# Patient Record
(demographics unavailable — no encounter records)

---

## 2024-11-05 NOTE — DISCUSSION/SUMMARY
[] : The components of the vaccine(s) to be administered today are listed in the plan of care. The disease(s) for which the vaccine(s) are intended to prevent and the risks have been discussed with the caretaker.  The risks are also included in the appropriate vaccination information statements which have been provided to the patient's caregiver.  The caregiver has given consent to vaccinate. [FreeTextEntry1] : R. deltoid: Quadracel , Fluzone         L. upper arm: MMR       Pt tolerated well.

## 2024-11-06 NOTE — HISTORY OF PRESENT ILLNESS
[Mother] : mother [whole ___ oz/d] : consumes [unfilled] oz of whole cow's milk per day [Sugar drinks] : sugar drinks [Fruit] : fruit [Vegetables] : vegetables [Meat] : meat [Grains] : grains [Eggs] : eggs [Fish] : fish [Dairy] : dairy [Toilet Trained] : toilet trained [Normal] : Normal [In own bed] : In own bed [Brushing teeth] : Brushing teeth [Yes] : Patient goes to dentist yearly [In Pre-K] : In Pre-K [Playtime (60 min/d)] : Playtime 60 min a day [< 2 hrs of screen time] : Less than 2 hrs of screen time [Appropiate parent-child communication] : Appropriate parent-child communication [Child given choices] : Child given choices [Child Cooperates] : Child cooperates [Parent has appropriate responses to behavior] : Parent has appropriate responses to behavior [Up to date] : Up to date [de-identified] : drinks water [FreeTextEntry3] : will sleep w mom overnight [FreeTextEntry1] : Allergies - trees, dust mites, roaches uses Claritin daily, Montelukast qhs prn for runny nose (had tried zyrtec but was wetting the bed w it)  Had chronic foot pain. Seen by Podiatry - needs custom orthotics

## 2024-11-06 NOTE — PHYSICAL EXAM
[Alert] : alert [No Acute Distress] : no acute distress [Playful] : playful [Normocephalic] : normocephalic [Conjunctivae with no discharge] : conjunctivae with no discharge [PERRL] : PERRL [EOMI Bilateral] : EOMI bilateral [Auricles Well Formed] : auricles well formed [Clear Tympanic membranes with present light reflex and bony landmarks] : clear tympanic membranes with present light reflex and bony landmarks [No Discharge] : no discharge [Nares Patent] : nares patent [Pink Nasal Mucosa] : pink nasal mucosa [Palate Intact] : palate intact [Uvula Midline] : uvula midline [Nonerythematous Oropharynx] : nonerythematous oropharynx [No Caries] : no caries [Trachea Midline] : trachea midline [Supple, full passive range of motion] : supple, full passive range of motion [No Palpable Masses] : no palpable masses [Symmetric Chest Rise] : symmetric chest rise [Clear to Auscultation Bilaterally] : clear to auscultation bilaterally [Normoactive Precordium] : normoactive precordium [Regular Rate and Rhythm] : regular rate and rhythm [Normal S1, S2 present] : normal S1, S2 present [No Murmurs] : no murmurs [+2 Femoral Pulses] : +2 femoral pulses [Soft] : soft [NonTender] : non tender [Non Distended] : non distended [No Hepatomegaly] : no hepatomegaly [No Splenomegaly] : no splenomegaly [Rashaun 1] : Rashaun 1 [No Clitoromegaly] : no clitoromegaly [Normal Vagina Introitus] : normal vagina introitus [Submandibular] : submandibular [Symmetric Buttocks Creases] : symmetric buttocks creases [Symmetric Hip Rotation] : symmetric hip rotation [No Gait Asymmetry] : no gait asymmetry [No pain or deformities with palpation of bone, muscles, joints] : no pain or deformities with palpation of bone, muscles, joints [Normal Muscle Tone] : normal muscle tone [No Spinal Dimple] : no spinal dimple [NoTuft of Hair] : no tuft of hair [Straight] : straight [+2 Patella DTR] : +2 patella DTR [Cranial Nerves Grossly Intact] : cranial nerves grossly intact [No Rash or Lesions] : no rash or lesions [de-identified] : L tonsil mildly enlarged [de-identified] : 1cm mobile soft lymph node palpated on left

## 2024-11-06 NOTE — DISCUSSION/SUMMARY
[Normal Growth] : growth [Normal Development] : development  [No Elimination Concerns] : elimination [Continue Regimen] : feeding [No Skin Concerns] : skin [Normal Sleep Pattern] : sleep [School Readiness] : school readiness [Healthy Personal Habits] : healthy personal habits [TV/Media] : tv/media [Child and Family Involvement] : child and family involvement [Safety] : safety [Anticipatory Guidance Given] : Anticipatory guidance addressed as per the history of present illness section [DTaP] : diptheria, tetanus and pertussis [Influenza] : influenza [IPV] : inactivated poliovirus [MMR] : measles, mumps and rubella [No Medication Changes] : No medication changes at this time [FreeTextEntry1] : Flaquita is a 3 y/o F here for [facility]  Health Maintenance - growing and developing well - continue daily claritin and prn montelukast per allergist - monitor left submandibular node at next routine visit - 4yr vaccines and Flu administered - return to clinic in 1yr or sooner as needed

## 2024-11-06 NOTE — DISCUSSION/SUMMARY
[Normal Growth] : growth [Normal Development] : development  [No Elimination Concerns] : elimination [Continue Regimen] : feeding [No Skin Concerns] : skin [Normal Sleep Pattern] : sleep [School Readiness] : school readiness [Healthy Personal Habits] : healthy personal habits [TV/Media] : tv/media [Child and Family Involvement] : child and family involvement [Safety] : safety [Anticipatory Guidance Given] : Anticipatory guidance addressed as per the history of present illness section [DTaP] : diptheria, tetanus and pertussis [Influenza] : influenza [IPV] : inactivated poliovirus [MMR] : measles, mumps and rubella [No Medication Changes] : No medication changes at this time [FreeTextEntry1] : Flaquita is a 5 y/o F here for [facility]  Health Maintenance - growing and developing well - continue daily claritin and prn montelukast per allergist - monitor left submandibular node at next routine visit - 4yr vaccines and Flu administered - return to clinic in 1yr or sooner as needed

## 2024-11-06 NOTE — PHYSICAL EXAM
[Alert] : alert [No Acute Distress] : no acute distress [Playful] : playful [Normocephalic] : normocephalic [Conjunctivae with no discharge] : conjunctivae with no discharge [PERRL] : PERRL [EOMI Bilateral] : EOMI bilateral [Auricles Well Formed] : auricles well formed [Clear Tympanic membranes with present light reflex and bony landmarks] : clear tympanic membranes with present light reflex and bony landmarks [No Discharge] : no discharge [Nares Patent] : nares patent [Pink Nasal Mucosa] : pink nasal mucosa [Palate Intact] : palate intact [Uvula Midline] : uvula midline [Nonerythematous Oropharynx] : nonerythematous oropharynx [No Caries] : no caries [Trachea Midline] : trachea midline [Supple, full passive range of motion] : supple, full passive range of motion [No Palpable Masses] : no palpable masses [Symmetric Chest Rise] : symmetric chest rise [Clear to Auscultation Bilaterally] : clear to auscultation bilaterally [Normoactive Precordium] : normoactive precordium [Regular Rate and Rhythm] : regular rate and rhythm [Normal S1, S2 present] : normal S1, S2 present [No Murmurs] : no murmurs [+2 Femoral Pulses] : +2 femoral pulses [Soft] : soft [NonTender] : non tender [Non Distended] : non distended [No Hepatomegaly] : no hepatomegaly [No Splenomegaly] : no splenomegaly [Rashaun 1] : Rashaun 1 [No Clitoromegaly] : no clitoromegaly [Normal Vagina Introitus] : normal vagina introitus [Submandibular] : submandibular [Symmetric Buttocks Creases] : symmetric buttocks creases [Symmetric Hip Rotation] : symmetric hip rotation [No Gait Asymmetry] : no gait asymmetry [No pain or deformities with palpation of bone, muscles, joints] : no pain or deformities with palpation of bone, muscles, joints [Normal Muscle Tone] : normal muscle tone [No Spinal Dimple] : no spinal dimple [NoTuft of Hair] : no tuft of hair [Straight] : straight [+2 Patella DTR] : +2 patella DTR [Cranial Nerves Grossly Intact] : cranial nerves grossly intact [No Rash or Lesions] : no rash or lesions [de-identified] : L tonsil mildly enlarged [de-identified] : 1cm mobile soft lymph node palpated on left

## 2024-11-06 NOTE — HISTORY OF PRESENT ILLNESS
[Mother] : mother [whole ___ oz/d] : consumes [unfilled] oz of whole cow's milk per day [Sugar drinks] : sugar drinks [Fruit] : fruit [Vegetables] : vegetables [Meat] : meat [Grains] : grains [Eggs] : eggs [Fish] : fish [Dairy] : dairy [Toilet Trained] : toilet trained [Normal] : Normal [In own bed] : In own bed [Brushing teeth] : Brushing teeth [Yes] : Patient goes to dentist yearly [In Pre-K] : In Pre-K [Playtime (60 min/d)] : Playtime 60 min a day [< 2 hrs of screen time] : Less than 2 hrs of screen time [Appropiate parent-child communication] : Appropriate parent-child communication [Child given choices] : Child given choices [Child Cooperates] : Child cooperates [Parent has appropriate responses to behavior] : Parent has appropriate responses to behavior [Up to date] : Up to date [de-identified] : drinks water [FreeTextEntry3] : will sleep w mom overnight [FreeTextEntry1] : Allergies - trees, dust mites, roaches uses Claritin daily, Montelukast qhs prn for runny nose (had tried zyrtec but was wetting the bed w it)  Had chronic foot pain. Seen by Podiatry - needs custom orthotics

## 2025-04-25 NOTE — END OF VISIT
[FreeTextEntry3] : A physician assistant/resident assisted with documenting the visit and acted as a scribe. I have seen and examined the patient, made my assessment and plan and have made all modifications necessary to the note.  Cortney Petty MD Pediatric Orthopaedics Surgery [facility]

## 2025-04-25 NOTE — END OF VISIT
[FreeTextEntry3] : A physician assistant/resident assisted with documenting the visit and acted as a scribe. I have seen and examined the patient, made my assessment and plan and have made all modifications necessary to the note.  Cortney Petty MD Pediatric Orthopaedics Surgery [facility]

## 2025-04-25 NOTE — DATA REVIEWED
[de-identified] : AP and lateral of the bilateral tibia taken in the office today. Good overall alignment. NO fracture or dislocation. No periosteal reaction noted. No lesions. Skeletally immature.

## 2025-04-25 NOTE — REASON FOR VISIT
[Initial Evaluation] : an initial evaluation [Patient] : patient [Mother] : mother [FreeTextEntry1] : bilateral leg pain

## 2025-04-25 NOTE — HISTORY OF PRESENT ILLNESS
[0] : currently ~his/her~ pain is 0 out of 10 [FreeTextEntry1] : 4 and a half yo female presents with mother for evaluation of bilateral intermittent leg pain. Mother states she has been having pain in the bilateral legs for approx 1 year. The pain is in both legs equally. Mother states she had blood work drawn by the pediatrician in November 2024 which was without concern according to mother. She was seen by podiatry a few months ago and it was felt she had pes planus and custom inserts recommended and fabricated. Mother states she has been using for approx 2 months and no change in the leg pain. The pain is in the evening before bed. It is not waking her from sleep. No swelling noted.  No limp or pain during the day. No change in activity level. Mother massages the legs and there is improvement.

## 2025-04-25 NOTE — DATA REVIEWED
[de-identified] : AP and lateral of the bilateral tibia taken in the office today. Good overall alignment. NO fracture or dislocation. No periosteal reaction noted. No lesions. Skeletally immature.

## 2025-04-25 NOTE — PHYSICAL EXAM
[FreeTextEntry1] : GAIT: No limp. Good coordination and balance noted. GENERAL: alert, cooperative pleasant young 3 yo female in NAD SKIN: The skin is intact, warm, pink and dry over the area examined. EYES: Normal conjunctiva, normal eyelids and pupils were equal and round. ENT: normal ears, normal nose and normal lips. CARDIOVASCULAR: brisk capillary refill, but no peripheral edema. RESPIRATORY: The patient is in no apparent respiratory distress. They're taking full deep breaths without use of accessory muscles or evidence of audible wheezes or stridor without the use of a stethoscope. Normal respiratory effort. ABDOMEN: not examined   LOWER extremity: Neutral alignment of the lower extremities. No LLD Hips full flexion and extension. Wide symmetrical abduction. Neg galleazzi. Symmetrical IR and ER. Knee: full flexion and extension. No effusion. No tenderness to palpation. No instability to stress Tibia: no sts noted. No tenderness. No warmth or erythema. No masses PA: 10 degrees Ankle/foot: arch present at rest. No callouses on the feet. DF 40-50 with knee flexed bilaterally Upon standing, arch collapses slightly. Heels into varus with toe raise.  Motor strength 5/5, sensation grossly intact, brisk cap refill Reflexes symmetrical . Neg babinski, neg clonus

## 2025-04-25 NOTE — PHYSICAL EXAM
[FreeTextEntry1] : GAIT: No limp. Good coordination and balance noted. GENERAL: alert, cooperative pleasant young 5 yo female in NAD SKIN: The skin is intact, warm, pink and dry over the area examined. EYES: Normal conjunctiva, normal eyelids and pupils were equal and round. ENT: normal ears, normal nose and normal lips. CARDIOVASCULAR: brisk capillary refill, but no peripheral edema. RESPIRATORY: The patient is in no apparent respiratory distress. They're taking full deep breaths without use of accessory muscles or evidence of audible wheezes or stridor without the use of a stethoscope. Normal respiratory effort. ABDOMEN: not examined   LOWER extremity: Neutral alignment of the lower extremities. No LLD Hips full flexion and extension. Wide symmetrical abduction. Neg galleazzi. Symmetrical IR and ER. Knee: full flexion and extension. No effusion. No tenderness to palpation. No instability to stress Tibia: no sts noted. No tenderness. No warmth or erythema. No masses PA: 10 degrees Ankle/foot: arch present at rest. No callouses on the feet. DF 40-50 with knee flexed bilaterally Upon standing, arch collapses slightly. Heels into varus with toe raise.  Motor strength 5/5, sensation grossly intact, brisk cap refill Reflexes symmetrical . Neg babinski, neg clonus

## 2025-04-25 NOTE — ASSESSMENT
[FreeTextEntry1] : Growing Pains  The history for today's visit was obtained from the child, as well as the parent. The child's history was unreliable alone due to age and therefore, the parent was used today as an independent historian. AP and lateral of the bilateral tibia taken inthe office today. Good overall alignment. NO fracture or dislocation. No periosteal reaction noted. No lesions. Skeletally immature.   This was discussed at length with parent. There are no concerning features on the patient's PE or history today. This is a very common occurrence in this age group and can continue until approx age 8. Concerning features of leg pain in children were discussed at length. Swelling, limp, change in activity level during the day, pain only on one side, fever, chills were all discussed.  If there are any concerns in the future that there is a change, she will return for reevaluation. Continued massage to area when pain present, placebo instead of medication discussed.  All questions answered, reassurance given.   Martha FERRARI, MPAS, PAC have acted as scribe and documented the above for Dr Petty.